# Patient Record
Sex: MALE | Race: WHITE | NOT HISPANIC OR LATINO | Employment: OTHER | URBAN - METROPOLITAN AREA
[De-identification: names, ages, dates, MRNs, and addresses within clinical notes are randomized per-mention and may not be internally consistent; named-entity substitution may affect disease eponyms.]

---

## 2023-09-02 LAB
ANION GAP IN SER/PLAS: 12 MMOL/L (ref 10–20)
CALCIUM (MG/DL) IN SER/PLAS: 10.1 MG/DL (ref 8.6–10.3)
CARBON DIOXIDE, TOTAL (MMOL/L) IN SER/PLAS: 25 MMOL/L (ref 21–32)
CHLORIDE (MMOL/L) IN SER/PLAS: 106 MMOL/L (ref 98–107)
CREATININE (MG/DL) IN SER/PLAS: 1.38 MG/DL (ref 0.5–1.3)
ERYTHROCYTE DISTRIBUTION WIDTH (RATIO) BY AUTOMATED COUNT: 13.2 % (ref 11.5–14.5)
ERYTHROCYTE MEAN CORPUSCULAR HEMOGLOBIN CONCENTRATION (G/DL) BY AUTOMATED: 33.2 G/DL (ref 32–36)
ERYTHROCYTE MEAN CORPUSCULAR VOLUME (FL) BY AUTOMATED COUNT: 92 FL (ref 80–100)
ERYTHROCYTES (10*6/UL) IN BLOOD BY AUTOMATED COUNT: 4.94 X10E12/L (ref 4.5–5.9)
GFR MALE: 51 ML/MIN/1.73M2
GLUCOSE (MG/DL) IN SER/PLAS: 116 MG/DL (ref 74–99)
HEMATOCRIT (%) IN BLOOD BY AUTOMATED COUNT: 45.5 % (ref 41–52)
HEMOGLOBIN (G/DL) IN BLOOD: 15.1 G/DL (ref 13.5–17.5)
LEUKOCYTES (10*3/UL) IN BLOOD BY AUTOMATED COUNT: 8.4 X10E9/L (ref 4.4–11.3)
PLATELETS (10*3/UL) IN BLOOD AUTOMATED COUNT: 238 X10E9/L (ref 150–450)
POTASSIUM (MMOL/L) IN SER/PLAS: 4.2 MMOL/L (ref 3.5–5.3)
SODIUM (MMOL/L) IN SER/PLAS: 139 MMOL/L (ref 136–145)
UREA NITROGEN (MG/DL) IN SER/PLAS: 22 MG/DL (ref 6–23)

## 2023-09-08 ENCOUNTER — HOSPITAL ENCOUNTER (OUTPATIENT)
Dept: DATA CONVERSION | Facility: HOSPITAL | Age: 83
End: 2023-09-08
Attending: INTERNAL MEDICINE | Admitting: INTERNAL MEDICINE
Payer: COMMERCIAL

## 2023-09-08 DIAGNOSIS — Z95.5 PRESENCE OF CORONARY ANGIOPLASTY IMPLANT AND GRAFT: ICD-10-CM

## 2023-09-08 DIAGNOSIS — I25.2 OLD MYOCARDIAL INFARCTION: ICD-10-CM

## 2023-09-08 DIAGNOSIS — I25.10 ATHEROSCLEROTIC HEART DISEASE OF NATIVE CORONARY ARTERY WITHOUT ANGINA PECTORIS: ICD-10-CM

## 2023-09-08 DIAGNOSIS — E78.5 HYPERLIPIDEMIA, UNSPECIFIED: ICD-10-CM

## 2023-09-08 DIAGNOSIS — I25.110 ATHEROSCLEROTIC HEART DISEASE OF NATIVE CORONARY ARTERY WITH UNSTABLE ANGINA PECTORIS (MULTI): ICD-10-CM

## 2023-09-08 DIAGNOSIS — I20.9 ANGINA PECTORIS, UNSPECIFIED (CMS-HCC): ICD-10-CM

## 2023-09-11 LAB
ATRIAL RATE: 50 BPM
P AXIS: -2 DEGREES
P OFFSET: 185 MS
P ONSET: 146 MS
PR INTERVAL: 126 MS
Q ONSET: 209 MS
QRS COUNT: 9 BEATS
QRS DURATION: 92 MS
QT INTERVAL: 452 MS
QTC CALCULATION(BAZETT): 412 MS
QTC FREDERICIA: 425 MS
R AXIS: 10 DEGREES
T AXIS: 64 DEGREES
T OFFSET: 435 MS
VENTRICULAR RATE: 50 BPM

## 2023-09-20 ENCOUNTER — HOSPITAL ENCOUNTER (OUTPATIENT)
Dept: DATA CONVERSION | Facility: HOSPITAL | Age: 83
End: 2023-09-20
Attending: INTERNAL MEDICINE | Admitting: INTERNAL MEDICINE
Payer: COMMERCIAL

## 2023-09-20 DIAGNOSIS — I25.2 OLD MYOCARDIAL INFARCTION: ICD-10-CM

## 2023-09-20 DIAGNOSIS — E78.5 HYPERLIPIDEMIA, UNSPECIFIED: ICD-10-CM

## 2023-09-20 DIAGNOSIS — I25.118 ATHEROSCLEROTIC HEART DISEASE OF NATIVE CORONARY ARTERY WITH OTHER FORMS OF ANGINA PECTORIS (CMS-HCC): ICD-10-CM

## 2023-09-20 DIAGNOSIS — I10 ESSENTIAL (PRIMARY) HYPERTENSION: ICD-10-CM

## 2023-09-20 DIAGNOSIS — I25.10 ATHEROSCLEROTIC HEART DISEASE OF NATIVE CORONARY ARTERY WITHOUT ANGINA PECTORIS: ICD-10-CM

## 2023-09-21 LAB — POC ACTIVATED CLOTTING TIME LOW RANGE: 392 SECONDS (ref 89–169)

## 2023-09-30 NOTE — H&P
History & Physical Reviewed:   I have reviewed the History and Physical dated:  24-Aug-2023   History and Physical reviewed and relevant findings noted. Patient examined to review pertinent physical  findings.: Significant findings noted below   Findings: Cath Attending: Dr. Godoy    Pt is an 83 y/o M with PMH: CAD, CABG 1995, A-fib, HTN, TIA, GERD, prostate CA & stress incontinence.  Pt. here for Staged PCI with possible Rotoblade / ICS Laser Atherectomy.      Full Note sent from referring physician, Dr. Kale Chino dated Aug, 2023, in [facility]   Home Medications Reviewed: no changes noted   Allergies Reviewed: no changes noted       Airway/Sedation Assessment:  ·  Emotional Status calm   ·  Neurologic alert & oriented x 3   ·  Respiratory clear to auscultation   ·  Cardiovascular rhythm & rate regular   ·  GI/ soft, nontender     · Pulses present: Pedal Left, Pedal Right, Radial Left, Radial Right     ·  Mouth Opening OK yes   ·  Neck Flexibility OK yes   ·  Loose Teeth no   ·  Oropharyngeal Classification Class II   ·  ASA PS Classification ASA II   ·  Sedation Plan moderate sedation       ERAS (Enhanced Recovery After Surgery):  ·  ERAS Patient: no     Consent:   COVID-19 Consent:  ·  COVID-19 Risk Consent Surgeon has reviewed key risks related to the risk of gemma COVID-19 and if they contract COVID-19 what the risks are.     Coronavirus Attestation of Need for Surgery:  ·  COVID-19 Surgery / Procedure Attestation: Select all criteria that apply Medically necessary with no anticipated overnight stay       Electronic Signatures:  Delaney Hardin (PAC)  (Signed 20-Sep-2023 11:55)   Authored: History & Physical Reviewed, Airway/Sedation,  ERAS, Consent, Note Completion      Last Updated: 20-Sep-2023 11:55 by Delaney Hardin (PAC)

## 2023-09-30 NOTE — H&P
History of Present Illness:   History Present Illness:  Reason for surgery: angina   HPI:    Patient with hx of CAD s/p CABG and multiple PCI with C/O recurrent SOB similar to his prior angina symptoms    Allergies:        Allergies:  ·  Bactrim : Rash  ·  lisinopril : Other  ·  statins : Other    Home Medication Review:   Home Medications Reviewed: yes     Impression/Procedure:   ·  Impression and Planned Procedure: 1) Unstable angina   Plan for CC/PTCA  Risks, benefits and alternatives of procedure explained to patient including risk of stroke and death and patient agrees to proceed       ERAS (Enhanced Recovery After Surgery):  ·  ERAS Patient: no       Physical Exam by System:    Constitutional: Well developed, awake/alert/oriented  x3, no distress, alert and cooperative   Eyes: PERRL, EOMI, clear sclera   ENMT: mucous membranes moist, no apparent injury,  no lesions seen   Head/Neck: Neck supple, no apparent injury, thyroid  without mass or tenderness, No JVD, trachea midline, no bruits   Respiratory/Thorax: Patent airways, CTAB, normal  breath sounds with good chest expansion, thorax symmetric   Cardiovascular: Regular, rate and rhythm, no murmurs,  2+ equal pulses of the extremities, normal S 1and S 2   Gastrointestinal: Nondistended, soft, non-tender,  no rebound tenderness or guarding, no masses palpable, no organomegaly, +BS, no bruits   Genitourinary: No Discharge, vesicles or other abnormalities   Musculoskeletal: ROM intact, no joint swelling, normal  strength   Extremities: normal extremities, no cyanosis edema,  contusions or wounds, no clubbing   Neurological: alert and oriented x3, intact senses,  motor, response and reflexes, normal strength   Skin: Warm and dry, no lesions, no rashes     Airway/Sedation Assessment:  ·  Emotional Status calm   ·  Neurologic alert & oriented x 3   ·  Respiratory clear to auscultation   ·  Cardiovascular rhythm & rate regular   ·  GI/ soft, nontender     ·  Pulses present: Pedal Left, Pedal Right, Radial Left, Radial Right     ·  Mouth Opening OK yes   ·  Neck Flexibility OK yes   ·  Loose Teeth no   ·  Oropharyngeal Classification Class II   ·  Sedation Plan moderate sedation     Consent:   COVID-19 Consent:  ·  COVID-19 Risk Consent Surgeon has reviewed key risks related to the risk of gemma COVID-19 and if they contract COVID-19 what the risks are.     Coronavirus Attestation of Need for Surgery:  ·  COVID-19 Surgery / Procedure Attestation: Select all criteria that apply Presence of severe symptoms causing an inability to perform activities of daily living       Electronic Signatures:  Archie Godoy)  (Signed 08-Sep-2023 07:20)   Authored: History of Present Illness, Allergies, Home  Medication Review, Impression/Procedure, ERAS, Physical Exam, Consent, Note Completion      Last Updated: 08-Sep-2023 07:20 by Archie Godoy)

## 2023-10-16 LAB
ATRIAL RATE: 52 BPM
P AXIS: 54 DEGREES
P OFFSET: 194 MS
P ONSET: 157 MS
PR INTERVAL: 132 MS
Q ONSET: 223 MS
QRS COUNT: 9 BEATS
QRS DURATION: 92 MS
QT INTERVAL: 460 MS
QTC CALCULATION(BAZETT): 427 MS
QTC FREDERICIA: 438 MS
R AXIS: -4 DEGREES
T AXIS: 66 DEGREES
T OFFSET: 453 MS
VENTRICULAR RATE: 52 BPM

## 2023-10-19 PROBLEM — I25.10 CAD (CORONARY ARTERY DISEASE): Status: ACTIVE | Noted: 2023-10-19

## 2023-10-19 PROBLEM — I48.0 PAROXYSMAL ATRIAL FIBRILLATION (MULTI): Status: ACTIVE | Noted: 2023-10-19

## 2023-10-19 RX ORDER — AMLODIPINE BESYLATE 2.5 MG/1
TABLET ORAL
COMMUNITY
Start: 2022-12-22 | End: 2023-10-20 | Stop reason: WASHOUT

## 2023-10-19 RX ORDER — RANOLAZINE 500 MG/1
500 TABLET, EXTENDED RELEASE ORAL 2 TIMES DAILY
COMMUNITY
Start: 2023-08-17 | End: 2023-10-20 | Stop reason: WASHOUT

## 2023-10-19 RX ORDER — AMLODIPINE BESYLATE 10 MG/1
10 TABLET ORAL DAILY
COMMUNITY
Start: 2023-07-02

## 2023-10-19 RX ORDER — APIXABAN 5 MG/1
5 TABLET, FILM COATED ORAL 2 TIMES DAILY
COMMUNITY
Start: 2023-07-31

## 2023-10-19 RX ORDER — METOPROLOL SUCCINATE 50 MG/1
TABLET, EXTENDED RELEASE ORAL
COMMUNITY
Start: 2023-06-25

## 2023-10-19 RX ORDER — VITAMIN E (DL,TOCOPHERYL ACET) 90 MG
1 CAPSULE ORAL DAILY
COMMUNITY

## 2023-10-19 RX ORDER — ISOSORBIDE MONONITRATE 120 MG/1
120 TABLET, EXTENDED RELEASE ORAL DAILY
COMMUNITY
Start: 2023-09-03

## 2023-10-19 RX ORDER — NITROGLYCERIN 0.4 MG/1
TABLET SUBLINGUAL
COMMUNITY
Start: 2023-08-04

## 2023-10-19 RX ORDER — SIMVASTATIN 40 MG/1
40 TABLET, FILM COATED ORAL NIGHTLY
COMMUNITY
Start: 2023-08-20

## 2023-10-19 NOTE — PROGRESS NOTES
"Counseling:  The patient was counseled regarding diagnostic results, instructions for management, risk factor reductions, prognosis, patient and family education, impressions, risks and benefits of treatment options and importance of compliance with treatment.      Chief Complaint:   The patient presents today for post-hospitalization followup of CAD s/p PCI.     History Of Present Illness:    Lucius Cerrato is a 83 y.o. male patient whose PMH is significant for CAD s/p CABG 1995, a-fib, HTN, h/o TIA, GERD, COPD, and prostate CA. He presents today for post-hospitalization followup of CAD s/p PCI. The patient was originally established with Dr. ANNALEE Chino who was planning on performing a [facility], but unfortunately his insurance was out of Dr. Chino's network and is referred to our service. On 09/08/2023, the patient was taken to the cath lab, revealing triple vessel disease and an occluded LIMA-LAD, where he underwent PCI of SVG-Dg. On 09/20/2023, the patient was taken to the cath lab at [facility] where he underwent staged rotational atherectomy and IVUS guided drug eluting stent placement in proximal LAD. The patient states that 2 weeks following his PCI and staged atherectomy he developed back pain with radiation to his chest for which Dr. Chino increased his Ranexa to 1000 mg BID. He denies any CP or discomfort currently. He also denies any SOB.      Last Recorded Vitals:  Vitals:    10/20/23 1454   BP: 120/70   BP Location: Left arm   Pulse: 58   Weight: 73.5 kg (162 lb)   Height: 1.778 m (5' 10\")       Past Surgical History:  He has no past surgical history on file.      Social History:  He reports that he has never smoked. He has never used smokeless tobacco. He reports that he does not currently use alcohol. He reports that he does not use drugs.    Family History:  No family history on file.     Allergies:  Bactrim [sulfamethoxazole-trimethoprim] and Atorvastatin    Outpatient Medications:  Current Outpatient " Medications   Medication Instructions    amLODIPine (Norvasc) 2.5 mg tablet TAKE 3 TABLETS BY MOUTH AT BEDTIME TAKE 1 ADDITIONAL TABLET ONCE DAILY AS NEEDED FOR BLOOD PRESSURE ABOVE 160MMHG AS DIRECTED    amLODIPine (NORVASC) 10 mg, oral, Daily    aspirin 81 mg EC tablet TAKE 1 TABLET BY MOUTH ONCE DAILY TO KEEP YOUR STENT OPEN    calcitriol (ROCALTROL) 0.25 mcg, oral, Daily    clopidogrel (Plavix) 75 mg tablet TAKE 1 TABLET BY MOUTH ONCE DAILY TO KEEP YOUR STENT OPEN    coenzyme Q10 (Co Q-10) 400 mg capsule 1 capsule, oral, Daily, With a meal    Eliquis 5 mg, oral, 2 times daily    isosorbide mononitrate ER (IMDUR) 120 mg, oral, Daily    metoprolol succinate XL (Toprol-XL) 50 mg 24 hr tablet TAKE 1/2 (ONE-HALF) TABLET BY MOUTH IN THE MORNING AND 1 TABLET IN THE EVENING AS DIRECTED    nitroglycerin (Nitrostat) 0.4 mg SL tablet     pantoprazole (PROTONIX) 40 mg, oral    ranolazine (RANEXA) 500 mg, oral, 2 times daily    ranolazine (RANEXA) 1,000 mg, oral, 2 times daily    simvastatin (ZOCOR) 40 mg, oral, Nightly     Review of Systems   All other systems reviewed and are negative.     Physical Exam:  Constitutional:       Appearance: Healthy appearance. Not in distress.   Neck:      Vascular: No JVR. JVD normal.   Pulmonary:      Effort: Pulmonary effort is normal.      Breath sounds: Normal breath sounds. No wheezing. No rhonchi. No rales.   Chest:      Chest wall: Not tender to palpatation.   Cardiovascular:      PMI at left midclavicular line. Normal rate. Regular rhythm. Normal S1. Normal S2.       Murmurs: There is no murmur.      No gallop.  No click. No rub.   Pulses:     Intact distal pulses.   Edema:     Peripheral edema absent.   Abdominal:      General: Bowel sounds are normal.      Palpations: Abdomen is soft.      Tenderness: There is no abdominal tenderness.   Musculoskeletal: Normal range of motion.         General: No tenderness. Skin:     General: Skin is warm and dry.   Neurological:      General: No  focal deficit present.      Mental Status: Alert and oriented to person, place and time.          Last Labs:  CBC -  Lab Results   Component Value Date    WBC 8.4 09/02/2023    HGB 15.1 09/02/2023    HCT 45.5 09/02/2023    MCV 92 09/02/2023     09/02/2023       CMP -  Lab Results   Component Value Date    CALCIUM 10.1 09/02/2023       RENAL FUNCTION PANEL -   Lab Results   Component Value Date    GLUCOSE 116 (H) 09/02/2023     09/02/2023    K 4.2 09/02/2023     09/02/2023    CO2 25 09/02/2023    ANIONGAP 12 09/02/2023    BUN 22 09/02/2023    CREATININE 1.38 (H) 09/02/2023    GFRMALE 51 (A) 09/02/2023    CALCIUM 10.1 09/02/2023        Last Cardiology Tests:  09/20/2023 - Cardiac Catheterization (LH)  1. S/p successful rotational atherectomy and IVUS guided drug eluting stent placement in proximal LAD.  2. Synergy XD 2.75mm x 48mm, postdilated to 3.5mm.  3. The procedure was successful, uncomplicated and well tolerated by the patient.    09/08/2023 - Cardiac Catheterization (LH)  1. Triple vessel disease.  2. Successful PCI of SVG to Dg.  3. Patent SVG to OM and RCA with occluded LIMA to LAD.    Lab review: I have personally reviewed the laboratory result(s).  Diagnostic review: I have personally reviewed the result(s) of the [facility].    Assessment/Plan   1) CAD s/p CABG 1995  Originally referred to Dr. ANNALEE Chino for [facility], but unfortunately not in insurance network  [facility] 09/08/2023 with triple vessel disease and an occluded LIMA-LAD s/p PCI of SVG-Dg  [facility] 09/20/2023 s/p staged rotational atherectomy and IVUS guided drug eluting stent placement in proximal LAD  Developed back pain with radiation to back approx. 2 weeks following above interventions  Ranexa increased to 1000 mg BID by Dr. Chino  Denies CP, chest discomfort or SOB  Goal LDL <70  Check lipid panel  Continue followup with Dr. Chino  Followup in our office as needed      Scribe Attestation  By signing my name below, I, Desire Perry Avila    attest that this documentation has been prepared under the direction and in the presence of Archie Godoy MD.

## 2023-10-20 ENCOUNTER — OFFICE VISIT (OUTPATIENT)
Dept: CARDIOLOGY | Facility: CLINIC | Age: 83
End: 2023-10-20
Payer: COMMERCIAL

## 2023-10-20 VITALS
BODY MASS INDEX: 23.19 KG/M2 | DIASTOLIC BLOOD PRESSURE: 70 MMHG | SYSTOLIC BLOOD PRESSURE: 120 MMHG | HEART RATE: 58 BPM | HEIGHT: 70 IN | WEIGHT: 162 LBS

## 2023-10-20 DIAGNOSIS — I25.728 CORONARY ARTERY DISEASE OF AUTOLOGOUS BYPASS GRAFT WITH STABLE ANGINA PECTORIS (CMS-HCC): Primary | ICD-10-CM

## 2023-10-20 PROCEDURE — 1036F TOBACCO NON-USER: CPT | Performed by: INTERNAL MEDICINE

## 2023-10-20 PROCEDURE — 93000 ELECTROCARDIOGRAM COMPLETE: CPT | Performed by: INTERNAL MEDICINE

## 2023-10-20 PROCEDURE — 1159F MED LIST DOCD IN RCRD: CPT | Performed by: INTERNAL MEDICINE

## 2023-10-20 PROCEDURE — 99213 OFFICE O/P EST LOW 20 MIN: CPT | Performed by: INTERNAL MEDICINE

## 2023-10-20 PROCEDURE — 1160F RVW MEDS BY RX/DR IN RCRD: CPT | Performed by: INTERNAL MEDICINE

## 2023-10-20 RX ORDER — RANOLAZINE 1000 MG/1
1000 TABLET, EXTENDED RELEASE ORAL 2 TIMES DAILY
COMMUNITY
Start: 2023-09-30

## 2023-10-20 RX ORDER — CALCITRIOL 0.25 UG/1
0.25 CAPSULE ORAL DAILY
COMMUNITY

## 2023-10-20 RX ORDER — PANTOPRAZOLE SODIUM 40 MG/1
40 TABLET, DELAYED RELEASE ORAL
COMMUNITY
Start: 2023-10-13

## 2023-10-20 ASSESSMENT — ENCOUNTER SYMPTOMS
OCCASIONAL FEELINGS OF UNSTEADINESS: 0
DEPRESSION: 0
LOSS OF SENSATION IN FEET: 0

## 2023-10-20 NOTE — Clinical Note
October 20, 2023       No Recipients    Patient: Lucius Cerrato   YOB: 1940   Date of Visit: 10/20/2023       Dear Dr. Maciel Recipients:    Thank you for referring Lucius Cerrato to me for evaluation. Below are my notes for this consultation.  If you have questions, please do not hesitate to call me. I look forward to following your patient along with you.       Sincerely,     Archie Godoy MD      CC:   No Recipients  ______________________________________________________________________________________    Counseling:  The patient was counseled regarding diagnostic results, instructions for management, risk factor reductions, prognosis, patient and family education, impressions, risks and benefits of treatment options and importance of compliance with treatment.      Chief Complaint:   The patient presents today for post-hospitalization followup of CAD s/p PCI.     History Of Present Illness:    Lucius Cerrato is a 83 y.o. male patient whose PMH is significant for CAD s/p CABG 1995, a-fib, HTN, h/o TIA, GERD, COPD, and prostate CA. He presents today for post-hospitalization followup of CAD s/p PCI. The patient was originally established with Dr. ANNALEE Chino who was planning on performing a [facility], but unfortunately his insurance was out of Dr. Chino's network and is referred to our service. On 09/08/2023, the patient was taken to the cath lab, revealing triple vessel disease and an occluded LIMA-LAD, where he underwent PCI of SVG-Dg. On 09/20/2023, the patient was taken to the cath lab at [facility] where he underwent staged rotational atherectomy and IVUS guided drug eluting stent placement in proximal LAD.       Last Recorded Vitals:  There were no vitals filed for this visit.    Past Surgical History:  He has no past surgical history on file.      Social History:  He has no history on file for tobacco use, alcohol use, and drug use.    Family History:  No family history on file.     Allergies:  Bactrim  [sulfamethoxazole-trimethoprim]    Outpatient Medications:  Current Outpatient Medications   Medication Instructions    amLODIPine (Norvasc) 2.5 mg tablet TAKE 3 TABLETS BY MOUTH AT BEDTIME TAKE 1 ADDITIONAL TABLET ONCE DAILY AS NEEDED FOR BLOOD PRESSURE ABOVE 160MMHG AS DIRECTED    amLODIPine (NORVASC) 10 mg, oral, Daily    aspirin 81 mg EC tablet TAKE 1 TABLET BY MOUTH ONCE DAILY TO KEEP YOUR STENT OPEN    clopidogrel (Plavix) 75 mg tablet TAKE 1 TABLET BY MOUTH ONCE DAILY TO KEEP YOUR STENT OPEN    coenzyme Q10 (Co Q-10) 400 mg capsule 1 capsule, oral, Daily, With a meal    Eliquis 5 mg, oral, 2 times daily    isosorbide mononitrate ER (IMDUR) 120 mg, oral, Daily    metoprolol succinate XL (Toprol-XL) 50 mg 24 hr tablet TAKE 1/2 (ONE-HALF) TABLET BY MOUTH IN THE MORNING AND 1 TABLET IN THE EVENING AS DIRECTED    nitroglycerin (Nitrostat) 0.4 mg SL tablet     ranolazine (RANEXA) 500 mg, oral, 2 times daily    simvastatin (ZOCOR) 40 mg, oral, Nightly     Review of Systems   All other systems reviewed and are negative.     Physical Exam:  Constitutional:       Appearance: Healthy appearance. Not in distress.   Neck:      Vascular: No JVR. JVD normal.   Pulmonary:      Effort: Pulmonary effort is normal.      Breath sounds: Normal breath sounds. No wheezing. No rhonchi. No rales.   Chest:      Chest wall: Not tender to palpatation.   Cardiovascular:      PMI at left midclavicular line. Normal rate. Regular rhythm. Normal S1. Normal S2.       Murmurs: There is no murmur.      No gallop.  No click. No rub.   Pulses:     Intact distal pulses.   Edema:     Peripheral edema absent.   Abdominal:      General: Bowel sounds are normal.      Palpations: Abdomen is soft.      Tenderness: There is no abdominal tenderness.   Musculoskeletal: Normal range of motion.         General: No tenderness. Skin:     General: Skin is warm and dry.   Neurological:      General: No focal deficit present.      Mental Status: Alert and  oriented to person, place and time.          Last Labs:  CBC -  Lab Results   Component Value Date    WBC 8.4 09/02/2023    HGB 15.1 09/02/2023    HCT 45.5 09/02/2023    MCV 92 09/02/2023     09/02/2023       CMP -  Lab Results   Component Value Date    CALCIUM 10.1 09/02/2023       RENAL FUNCTION PANEL -   Lab Results   Component Value Date    GLUCOSE 116 (H) 09/02/2023     09/02/2023    K 4.2 09/02/2023     09/02/2023    CO2 25 09/02/2023    ANIONGAP 12 09/02/2023    BUN 22 09/02/2023    CREATININE 1.38 (H) 09/02/2023    GFRMALE 51 (A) 09/02/2023    CALCIUM 10.1 09/02/2023        Last Cardiology Tests:  09/20/2023 - Cardiac Catheterization (LH)  1. S/p successful rotational atherectomy and IVUS guided drug eluting stent placement in proximal LAD.  2. Synergy XD 2.75mm x 48mm, postdilated to 3.5mm.  3. The procedure was successful, uncomplicated and well tolerated by the patient.    09/08/2023 - Cardiac Catheterization (LH)  1. Triple vessel disease.  2. Successful PCI of SVG to Dg.  3. Patent SVG to OM and RCA with occluded LIMA to LAD.    Lab review: I have personally reviewed the laboratory result(s).  Diagnostic review: I have personally reviewed the result(s) of the [facility].    Assessment/Plan  1) CAD s/p CABG 1995  Originally referred to Dr. ANNALEE Chino for [facility], but unfortunately not in insurance network  [facility] 09/08/2023 with triple vessel disease and an occluded LIMA-LAD s/p PCI of SVG-Dg  [facility] 09/20/2023 s/p staged rotational atherectomy and IVUS guided drug eluting stent placement in proximal LAD      Scribe Attestation  By signing my name below, IViktoria Scribe   attest that this documentation has been prepared under the direction and in the presence of Archie Godoy MD.

## 2023-10-20 NOTE — PATIENT INSTRUCTIONS
Continue all current medications as prescribed.   To prevent any future development of heart blockages, it is important to monitor and manage your cholesterol. Dr. Godoy has recommended a Mediterranean style diet. Watch carbohydrate intake (rice, potatoes, pasta, bread, ice-cream all within moderation); increase greens, veggies, fiber, lean protein (fish, turkey, chicken; baked/boiled/grilled, not fried) and fruit. Please see further documentation below.   Please have blood work drawn to followup on your cholesterol levels (fasting).   Followup with Dr. Godoy on an as needed basis.      Mediterranean Diet    About this topic  This is a heart healthy diet. It is based on widely used foods and cooking styles from many countries around the [place]. The main pattern for the diet is more plant foods and monounsaturated fats, or good fats, like olive oil. Protein in this diet comes from seafood, legumes, nuts, seeds, and poultry and eggs in lowered amounts. You will also eat more whole grains, vegetables, and fruits and moderate amounts of alcohol are also included. This diet has less red meats, dairy products, and processed foods.    What will the results be?  Your diet will have less saturated fat, cholesterol, calories, sodium, and added sugars. Your diet will be higher in fiber. This will help to keep your blood sugar steady. This diet lowers the chance of heart disease and other health problems.  What lifestyle changes are needed?  If you do not often eat this way, you will need to change your eating habits. Be sure to get regular exercise. It is believed to help the health benefits of this diet.  What changes to diet are needed?  You may need to limit the amount of red meat and processed foods in your diet. Ask your dietitian for help planning meals that are right for you.  What foods are good to eat?  Plenty of fish and other seafood  Fresh, frozen, or canned fruits and vegetables  Nuts and nut butters  and dried beans, lentils, or peas  Foods high in fiber like whole grains and whole grain products  Olive oil (good fat), peanut or canola oil, margarine, or spreads that list vegetable oil as the first ingredient and do not contain trans fat or partially hydrogenated oil  Small amounts of poultry and eggs  What foods should be limited or avoided?  Red meats  Sweets, desserts, and processed foods  Butter, oils, and fats that contain trans fats or are hydrogenated or partially hydrogenated  Gravies and sauces  What problems could happen?  Your weight may rise because your diet will be higher in fat from olive oil and nuts.  You may have lower iron levels. Be sure to eat foods rich in iron. Also, eat foods rich in vitamin C. This will help your body take in iron.  You may have lower calcium levels because you are eating less dairy products. Ask your doctor if you need to take a calcium supplement.  Wine is often thought of as part of a Mediterranean diet. It is not needed and you may choose not to include it. Avoid wine if you are prone to alcohol abuse or are pregnant. Also, avoid it if you are at risk for breast cancer, have liver problems, or have other illnesses that make it important for you to not have alcohol.  When do I need to call the doctor?  If you have any concerns about your diet  Last Reviewed Date  2021-10-11  Consumer Information Use and Disclaimer  This generalized information is a limited summary of diagnosis, treatment, and/or medication information. It is not meant to be comprehensive and should be used as a tool to help the user understand and/or assess potential diagnostic and treatment options. It does NOT include all information about conditions, treatments, medications, side effects, or risks that may apply to a specific patient. It is not intended to be medical advice or a substitute for the medical advice, diagnosis, or treatment of a health care provider based on the health care provider's  examination and assessment of a patient’s specific and unique circumstances. Patients must speak with a health care provider for complete information about their health, medical questions, and treatment options, including any risks or benefits regarding use of medications. This information does not endorse any treatments or medications as safe, effective, or approved for treating a specific patient. [employer] and its affiliates disclaim any warranty or liability relating to this information or the use thereof. The use of this information is governed by the Terms of Use, available at https://www.[employer].com/en/know/clinical-effectiveness-terms  Copyright © 2023 [employer] and its affiliates and/or licensors. All rights reserved.

## 2024-03-20 PROBLEM — R39.12 WEAK URINARY STREAM: Status: ACTIVE | Noted: 2017-06-01

## 2024-03-20 PROBLEM — R06.02 SHORTNESS OF BREATH: Status: ACTIVE | Noted: 2023-08-21

## 2024-03-20 PROBLEM — R31.0 GROSS HEMATURIA: Status: ACTIVE | Noted: 2017-05-25

## 2024-03-20 PROBLEM — I10 HYPERTENSION: Status: ACTIVE | Noted: 2022-08-11

## 2024-03-20 PROBLEM — M54.41 BILATERAL LOW BACK PAIN WITH RIGHT-SIDED SCIATICA: Status: ACTIVE | Noted: 2024-03-20

## 2024-03-20 PROBLEM — R82.90 FOUL SMELLING URINE: Status: ACTIVE | Noted: 2017-12-12

## 2024-03-20 PROBLEM — G45.9 TIA (TRANSIENT ISCHEMIC ATTACK): Status: ACTIVE | Noted: 2020-01-30

## 2024-03-20 PROBLEM — R07.9 CHEST PAIN: Status: ACTIVE | Noted: 2019-05-31

## 2024-03-20 PROBLEM — Z95.5 S/P DRUG ELUTING CORONARY STENT PLACEMENT: Status: ACTIVE | Noted: 2019-08-22

## 2024-03-20 PROBLEM — N30.00 ACUTE CYSTITIS WITHOUT HEMATURIA: Status: ACTIVE | Noted: 2017-12-14

## 2024-03-20 PROBLEM — Z95.1 HX OF CABG: Status: ACTIVE | Noted: 2019-08-22

## 2024-03-20 PROBLEM — N39.3 STRESS INCONTINENCE OF URINE: Status: ACTIVE | Noted: 2018-06-04

## 2024-03-20 PROBLEM — R33.9 URINARY RETENTION: Status: ACTIVE | Noted: 2018-03-28

## 2024-03-20 PROBLEM — R32 URINARY INCONTINENCE: Status: ACTIVE | Noted: 2017-12-12

## 2024-03-20 PROBLEM — Z85.46 HISTORY OF PROSTATE CANCER: Status: ACTIVE | Noted: 2018-12-13

## 2024-04-16 ENCOUNTER — APPOINTMENT (OUTPATIENT)
Dept: CARDIOLOGY | Facility: CLINIC | Age: 84
End: 2024-04-16
Payer: COMMERCIAL